# Patient Record
Sex: MALE | Race: WHITE | Employment: OTHER | URBAN - METROPOLITAN AREA
[De-identification: names, ages, dates, MRNs, and addresses within clinical notes are randomized per-mention and may not be internally consistent; named-entity substitution may affect disease eponyms.]

---

## 2021-01-26 ENCOUNTER — IMMUNIZATION (OUTPATIENT)
Dept: PRIMARY CARE CLINIC | Age: 76
End: 2021-01-26
Payer: MEDICARE

## 2021-01-26 PROCEDURE — 0001A COVID-19, PFIZER VACCINE 30MCG/0.3ML DOSE: CPT | Performed by: NURSE PRACTITIONER

## 2021-01-26 PROCEDURE — 91300 COVID-19, PFIZER VACCINE 30MCG/0.3ML DOSE: CPT | Performed by: NURSE PRACTITIONER

## 2021-02-17 ENCOUNTER — IMMUNIZATION (OUTPATIENT)
Dept: PRIMARY CARE CLINIC | Age: 76
End: 2021-02-17
Payer: MEDICARE

## 2021-02-17 PROCEDURE — 91300 COVID-19, PFIZER VACCINE 30MCG/0.3ML DOSE: CPT | Performed by: NURSE PRACTITIONER

## 2021-02-17 PROCEDURE — 0002A COVID-19, PFIZER VACCINE 30MCG/0.3ML DOSE: CPT | Performed by: NURSE PRACTITIONER

## 2022-07-11 PROBLEM — I73.9 PVD (PERIPHERAL VASCULAR DISEASE) (HCC): Status: ACTIVE | Noted: 2022-07-11

## 2023-11-03 PROBLEM — Z86.69 HISTORY OF HEARING LOSS: Status: ACTIVE | Noted: 2023-11-03

## 2023-11-03 PROBLEM — N18.30 CHRONIC KIDNEY DISEASE, STAGE 3 UNSPECIFIED (MULTI): Status: ACTIVE | Noted: 2022-07-12

## 2023-11-03 PROBLEM — H61.23 BILATERAL IMPACTED CERUMEN: Status: ACTIVE | Noted: 2023-11-03

## 2023-11-03 PROBLEM — R09.82 POST-NASAL DRAINAGE: Status: ACTIVE | Noted: 2023-11-03

## 2023-11-03 PROBLEM — H90.3 SENSORINEURAL HEARING LOSS, BILATERAL: Status: ACTIVE | Noted: 2023-11-03

## 2023-11-03 PROBLEM — E78.00 HYPERCHOLESTEROLEMIA: Status: ACTIVE | Noted: 2023-11-03

## 2023-11-03 PROBLEM — I73.9 PVD (PERIPHERAL VASCULAR DISEASE) (CMS-HCC): Status: ACTIVE | Noted: 2022-07-11

## 2023-11-03 PROBLEM — J34.89 NASAL DISCHARGE: Status: ACTIVE | Noted: 2023-11-03

## 2023-11-03 PROBLEM — G63 POLYNEUROPATHY IN DISEASES CLASSIFIED ELSEWHERE (MULTI): Status: ACTIVE | Noted: 2022-07-12

## 2023-11-03 RX ORDER — ATENOLOL 50 MG/1
50 TABLET ORAL 2 TIMES DAILY
COMMUNITY
Start: 2023-04-26

## 2023-11-03 RX ORDER — MAGNESIUM HYDROXIDE 400 MG/5ML
SUSPENSION, ORAL (FINAL DOSE FORM) ORAL
COMMUNITY
Start: 2017-08-17

## 2023-11-03 RX ORDER — RAMIPRIL 5 MG/1
CAPSULE ORAL
COMMUNITY
Start: 2020-09-11

## 2023-11-03 RX ORDER — ALBUTEROL SULFATE 90 UG/1
AEROSOL, METERED RESPIRATORY (INHALATION)
COMMUNITY
Start: 2020-09-11

## 2023-11-03 RX ORDER — GEMFIBROZIL 600 MG/1
600 TABLET, FILM COATED ORAL 2 TIMES DAILY
COMMUNITY
Start: 2015-07-30

## 2023-11-03 RX ORDER — CHOLECALCIFEROL (VITAMIN D3) 25 MCG
1000 TABLET ORAL
COMMUNITY

## 2023-11-03 RX ORDER — DIGOXIN 125 MCG
125 TABLET ORAL
COMMUNITY
Start: 2020-09-11

## 2023-11-03 RX ORDER — PRAVASTATIN SODIUM 40 MG/1
TABLET ORAL
COMMUNITY
Start: 2015-07-30

## 2023-11-03 RX ORDER — ALLOPURINOL 300 MG/1
300 TABLET ORAL
COMMUNITY
Start: 2023-04-26

## 2023-11-03 RX ORDER — PROBENECID 500 MG/1
500 TABLET, FILM COATED ORAL
COMMUNITY
Start: 2016-03-31

## 2023-11-03 NOTE — PROGRESS NOTES
HEARING AID EVALUATION    RIGHT: ReSound SN: [id number]  : needs updated  Wax Guard/Dome: needs updated  LEFT: ReSound SN: [id number]  : needs updated  Wax Guard/Dome: needs updated    Repair Warranty: out of warranty  L&D Warranty: out of warranty    Antonio Lamb was seen for a hearing aid check following ENT and audiometric evaluation appts.  Listening check revealed fair working function of devices. Please recall, patient had obtained his hearing instruments prior to 2012.    Patient noted he does not consistently utilize his devices as he is having significant difficulty with them.  He noted that he does not perform well in the presence of background noise.  Instead of conducting hearing aid check, decided to have hearing aid consultation.  Discussed different types of devices, styles, levels of technology, and pricing.  Patient does have hearing aid benefits through a third party per his insurance handout, though understands if he comes here he would be unable to utilize his benefits.  Should patient move forward with devices, he would move forward with two rechargeable Phonak Lumity RITE devices in the color champagne.  Patient will call with next steps. Patient satisfied.    N/c    Josep Pepe, CCC-A    Appt time: 9:30 - 10:15 AM

## 2023-11-09 ENCOUNTER — OFFICE VISIT (OUTPATIENT)
Dept: OTOLARYNGOLOGY | Facility: CLINIC | Age: 78
End: 2023-11-09
Payer: MEDICARE

## 2023-11-09 ENCOUNTER — CLINICAL SUPPORT (OUTPATIENT)
Dept: AUDIOLOGY | Facility: CLINIC | Age: 78
End: 2023-11-09
Payer: MEDICARE

## 2023-11-09 VITALS
DIASTOLIC BLOOD PRESSURE: 63 MMHG | WEIGHT: 138 LBS | BODY MASS INDEX: 23.56 KG/M2 | SYSTOLIC BLOOD PRESSURE: 109 MMHG | HEART RATE: 56 BPM | HEIGHT: 64 IN

## 2023-11-09 DIAGNOSIS — H90.3 SENSORINEURAL HEARING LOSS, BILATERAL: ICD-10-CM

## 2023-11-09 DIAGNOSIS — J34.89 NASAL DISCHARGE: ICD-10-CM

## 2023-11-09 DIAGNOSIS — H61.23 BILATERAL IMPACTED CERUMEN: Primary | ICD-10-CM

## 2023-11-09 DIAGNOSIS — Z86.39 HISTORY OF DIABETES MELLITUS: ICD-10-CM

## 2023-11-09 DIAGNOSIS — H90.3 SENSORINEURAL HEARING LOSS, BILATERAL: Primary | ICD-10-CM

## 2023-11-09 PROCEDURE — 1159F MED LIST DOCD IN RCRD: CPT | Performed by: OTOLARYNGOLOGY

## 2023-11-09 PROCEDURE — 3078F DIAST BP <80 MM HG: CPT | Performed by: OTOLARYNGOLOGY

## 2023-11-09 PROCEDURE — 99214 OFFICE O/P EST MOD 30 MIN: CPT | Performed by: OTOLARYNGOLOGY

## 2023-11-09 PROCEDURE — 92557 COMPREHENSIVE HEARING TEST: CPT | Performed by: AUDIOLOGIST

## 2023-11-09 PROCEDURE — 1036F TOBACCO NON-USER: CPT | Performed by: OTOLARYNGOLOGY

## 2023-11-09 PROCEDURE — 3074F SYST BP LT 130 MM HG: CPT | Performed by: OTOLARYNGOLOGY

## 2023-11-09 PROCEDURE — 69210 REMOVE IMPACTED EAR WAX UNI: CPT | Performed by: OTOLARYNGOLOGY

## 2023-11-09 NOTE — PROGRESS NOTES
Subjective   Patient ID: Antonio Lamb is a 78 y.o. male who presents for Follow-up, Cerumen Impaction, and Hearing Loss.  Hearing Loss      This 78-year-old male is being seen in the office today for recheck of his ears and hearing.  He is followed by our audiology staff as well over the past years since he wears hearing aids.  He had no difficulties with sudden hearing change or difficulties with ear pain and or drainage.  There is been no difficulties also with respiratory infections.  Very infrequently while he notes some fullness in his lower neck.      Objective   Physical Exam  EXAMINATION:    GENERAL VALENTINO.EARANCE: Alert, in no acute distress, normal pitch and clarity of voice, well-developed and nourished, cooperative.    HEAD/FACE: Normocephalic, atraumatic, normal facial movements and strength, no no tenderness to palpation, no lesions noted.    SKIN: Normal turgor, no raised or ulcerative lesions, warm and dry to palpation.    EYES: Extraocular motions intact, no nystagmus noted, pupils equal and reactive to light and accommodation, no conjunctivitis.    EARS: Both ears--external ear anatomy is normal without lesions, auditory canals are patent and without skin abrasions or lesions, cerumen partially obstructs both ears and is removed, hearing is intact to voice, tympanic membranes are intact with no acute inflammation, light reflexes present, no effusions are noted and no mastoid tenderness found to palpation.    NOSE: No external skin lesions are noted, nares are patent, septum is intact, sinuses are nontender to palpation bilaterally, no intranasal lesions or inflammation is noted, nasal valve is normal.    OROPHARYNX/ORAL CAVITY: Oropharynx is not inflamed and is without lesions, mucosa of the oral cavity is intact and without lesions, tongue is midline and mobile, no acute dental disease is noted, TMJs are mobile    LUNG-- NO wheezing or rhonchi normal respiratory effort    HEART-- No venous  congestion,  rate and rhythm regular,    NECK: No lymphadenopathy is palpated, carotid pulses are intact, neck is supple with full range of motion, no thyroid abnormalities are noted, trachea is midline, no neck masses are palpated.    LYMPHATICS: No cervical adenopathy or supraclavicular adenopathy is palpated.    NEUROLOGIC/PSYCH; alert and oriented, cranial nerves are grossly intact, gait is without falling, no motor deficits are noted.    Patient ID: Antonio Lamb is a 78 y.o. male.    Ear cerumen removal    Date/Time: 11/9/2023 9:18 AM    Performed by: Noel Garcia DMD, MD  Authorized by: Noel Garcia DMD, MD    Consent:     Consent obtained:  Verbal    Consent given by:  Patient    Risks, benefits, and alternatives were discussed: yes      Risks discussed:  Incomplete removal, pain, infection, dizziness and bleeding    Alternatives discussed:  Observation  Comments:        Procedure Ear Cleaning    Consent:  The planned procedure including the risks as well as alternatives of treatments were discussed and verbal consent obtained.    Procedure: Using otoscopic techniques, cerumen is removed with a wax loop from both ear canals.    Findings:  The external auditory canals are without inflammation or lesions and both tympanic members are normal in appearance with no evidence for middle ear effusion or signs of infection. No mastoid tenderness is noted. The patient tolerated the procedure well.      Audiogram done today revealed evidence for a mild low-frequency hearing loss in both ears in the mid frequencies down to 3000 Hz and a moderate loss is noted moderately severe in the remaining upper frequencies.  Discrimination scores 84% on the right 80% on the left both at 75 dB.  Assessment/Plan   Problem List Items Addressed This Visit             ICD-10-CM    Bilateral impacted cerumen - Primary H61.23    Nasal discharge J34.89    Sensorineural hearing loss, bilateral H90.3     Other Visit Diagnoses          Codes    History of diabetes mellitus     Z86.39          I discussed the clinical finds the patient.  There was some subtle changes in hearing on the right-hand side and both ears have had a decrease in discrimination ability.  There is a symmetric hearing loss at this point.  He has no complaints regarding tinnitus and or vertigo.  He is going to see our audiologist for check on upgrading his hearing aids which he feels would help him.  He is having no difficulties with pain or drainage although he should be seen if there is any sudden hearing change through the year or difficulties with discomfort.  Unrelated to his hearing he notes at times he will have some fullness sensations in his low neck area suggesting a globus sensation.  This is very infrequent but we did talk about swallowing and I made him aware the fact that if he starts to note difficulties more persistently he should contact the office and that would need to be assessed for him.  A yearly recheck is otherwise recommended and he should always contact the office if he has any sudden hearing change or difficulties with vertigo.

## 2023-11-09 NOTE — PROGRESS NOTES
[facility]  AUDIOMETRIC EVALUATION      Name:  Antonio Lamb   :  1945  Age:  78 y.o.  Date of Evaluation:  23    HISTORY    Antonio Lamb is seen today at the request of Noel Garcia DMD, MD, FACS. Patient stated that he has hearing aids but dies not wear them often. He further stated that he feels he may have some marginal decrease in his hearing since his last evaluation on 22.    EVALUATION  See Audiogram    RESULTS    Otoscopic Evaluation:  Right Ear:  minimal amount of cerumen  Left Ear:  minimal amount of cerumen    Pure Tone Audiometry:    Right Ear:  essentially normal to severe sensorineuralhearing loss  Left Ear:  essentially normal to severe sensorineuralhearing loss       Speech Audiometry:     Right Ear:  good in quiet when words were presented at 75 dBHL  Left Ear:  good in quiet when words were presented at 75 dBHL  Speech reception thresholds were in good agreement with pure tone testing.    DISCUSSION  Results were relayed to Noel Garcia DMD, MD, FACS    APPOINTMENT TIME  8:30 - 9:00 AM      Clari Saenz MA, CCC/A  Licensed Audiologist

## 2023-11-13 ENCOUNTER — TELEPHONE (OUTPATIENT)
Dept: AUDIOLOGY | Facility: CLINIC | Age: 78
End: 2023-11-13
Payer: MEDICARE

## 2023-11-13 NOTE — TELEPHONE ENCOUNTER
Antonio called regarding getting new hearing aids.  He reported that he was likely going to go with a different  to get his hearing aids though asked if he could follow up through us.  Discussed with patient that even if another company has the same devices they can sometimes be locked and we would not know if we could reprogram until we had the devices in front of us.  Additionally discussed $75 office visit charge if he does follow up through us.  Patient understood and satisfied.    Josep Pepe, CCC-A

## 2024-11-05 NOTE — PROGRESS NOTES
HEARING AID CHECK    RIGHT: Phonak Slim L90-R SN: [id number]  : 3 x M  Wax Guard/Dome: Cerustop,  Medium closed  LEFT: Phonak Slim L90-R SN: [id number]  : 3 x M  Wax Guard/Dome:  Medium closed    Repair Warranty: purchased online  L&D Warranty: purchased online  HA Office Visits: out of warranty - purchased online    Old Hearing Aids  RIGHT: ReSound SN: [id number]  : needs updated  Wax Guard/Dome: needs updated  LEFT: ReSound SN: [id number]  : needs updated  Wax Guard/Dome: needs updated     Repair Warranty: out of warranty  L&D Warranty: out of warranty       Antonio Lamb was seen for a hearing aid check following ENT and audiometric evaluation appts. Of note, patient had purchased Phonak hearing aids online.  Patient arrived to today's appointment with large open domes.  Counseled patient that the domes were inappropriate for his hearing sensitivity, and instead changed to medium closed domes. Listening check revealed good working function of devices.  Hearing aids were cleaned and domes and wax guards changed.  Patient satisfied.    $35 HAC with programming (, H90.3)    RECOMMENDATIONS:  -Recommend patient return in ~1 year or sooner should concerns arise.    Josep Pepe, CCC-A    Appt time: 11:20 AM - 11:40 AM

## 2024-11-08 NOTE — PROGRESS NOTES
Subjective   Patient ID: Antonio Lamb is a 79 y.o. male who presents for Follow-up.  HPI  This 79year-old male is being seen in the office today for recheck of his ears and hearing. He is followed by our audiology staff as well over the past years since he wears hearing aids. He had no difficulties with sudden hearing change or difficulties with ear pain and or drainage. There is been no difficulties also with respiratory infections. Very infrequently   he notes some fullness in his lower neck.  He did have an assessment by CT scanning this past year which showed evidence for hiatal hernia but no other significant health problems were noted on the scan.  He at times feels some fullness or resistance to swallowing very intermittently.  There is been no coughing or difficulties with aspiration.  No infections have been noted in the head and neck.  Review of Systems   A 12 point ROS  has been reviewed and are negative for complaint except for what is stated in the history of present illness and /or for past medical history as noted in the EMR.    Past Medical History:   Diagnosis Date    Impacted cerumen, bilateral 08/23/2018    Bilateral impacted cerumen    Personal history of other diseases of the musculoskeletal system and connective tissue     History of gout    Personal history of other diseases of the nervous system and sense organs     History of hearing loss    Pure hypercholesterolemia, unspecified     High cholesterol          Current Outpatient Medications:     albuterol 90 mcg/actuation inhaler, Inhale., Disp: , Rfl:     allopurinol (Zyloprim) 300 mg tablet, Take 1 tablet (300 mg) by mouth once daily., Disp: , Rfl:     atenolol (Tenormin) 50 mg tablet, Take 1 tablet (50 mg) by mouth twice a day., Disp: , Rfl:     cholecalciferol (Vitamin D-3) 25 MCG (1000 UT) tablet, Take 1 tablet (1,000 Units) by mouth once daily., Disp: , Rfl:     cyanocobalamin, vitamin B-12, 5,000 mcg tablet,disintegrating, Take by  "mouth., Disp: , Rfl:     digoxin (Lanoxin) 125 MCG tablet, Take 1 tablet (125 mcg) by mouth once daily., Disp: , Rfl:     gemfibrozil (Lopid) 600 mg tablet, Take 1 tablet (600 mg) by mouth twice a day., Disp: , Rfl:     pravastatin (Pravachol) 40 mg tablet, Take by mouth., Disp: , Rfl:     probenecid (Benemid) 500 mg tablet, Take 1 tablet (500 mg) by mouth once daily., Disp: , Rfl:     ramipril (Altace) 5 mg capsule, Take by mouth. (Patient not taking: Reported on 11/12/2024), Disp: , Rfl:      No Known Allergies    Height 1.651 m (5' 5\"), weight 64 kg (141 lb).    Objective   Physical Exam  EXAMINATION:     GENERAL VALENTINO.EARANCE: Alert, in no acute distress, normal pitch and clarity of voice, well-developed and nourished, cooperative.     HEAD/FACE: Normocephalic, atraumatic, normal facial movements and strength, no no tenderness to palpation, no lesions noted.     SKIN: Normal turgor, no raised or ulcerative lesions, warm and dry to palpation.     EYES: Extraocular motions intact, no nystagmus noted, pupils equal and reactive to light and accommodation, no conjunctivitis.     EARS: Both ears--external ear anatomy is normal without lesions, auditory canals are patent and without skin abrasions or lesions, cerumen partially obstructs both ears and is removed, hearing is intact to voice, tympanic membranes are intact with no acute inflammation, light reflexes present, no effusions are noted and no mastoid tenderness found to palpation.     NOSE: No external skin lesions are noted, nares are patent, septum is intact, sinuses are nontender to palpation bilaterally, no intranasal lesions or inflammation is noted, nasal valve is normal.     OROPHARYNX/ORAL CAVITY: Oropharynx is not inflamed and is without lesions, mucosa of the oral cavity is intact and without lesions, tongue is midline and mobile, no acute dental disease is noted, TMJs are mobile     LUNG-- NO wheezing or rhonchi normal respiratory effort     HEART-- No " venous congestion,  rate and rhythm regular,     NECK: No lymphadenopathy is palpated, carotid pulses are intact, neck is supple with full range of motion, no thyroid abnormalities are noted, trachea is midline, no neck masses are palpated.     LYMPHATICS: No cervical adenopathy or supraclavicular adenopathy is palpated.     NEUROLOGIC/PSYCH; alert and oriented, cranial nerves are grossly intact, gait is without falling, no motor deficits are noted.    Patient ID: Antonio Lamb is a 79 y.o. male.    Ear cerumen removal    Date/Time: 11/12/2024 11:10 AM    Performed by: Noel Garcia DMD, MD  Authorized by: Noel Garcia DMD, MD    Consent:     Consent obtained:  Verbal    Consent given by:  Patient    Risks discussed:  Pain and incomplete removal    Alternatives discussed:  No treatment and alternative treatment  Universal protocol:     Procedure explained and questions answered to patient or proxy's satisfaction: yes      Imaging studies available: no      Required blood products, implants, devices, and special equipment available: no      Patient identity confirmed:  Verbally with patient  Procedure details:     Location:  L ear and R ear    Procedure type: curette      Procedure type comment:  Or suction    Procedure outcomes: cerumen removed    Post-procedure details:     Inspection:  No bleeding and ear canal clear    Hearing quality:  Improved    Procedure completion:  Tolerated well, no immediate complications    His audiogram today continues to show a mild to moderately severe sloping sensorineural hearing loss in both ears.  80% discrimination ability is noted on the right at 80 dB 84% on the left at 80 dB.  There is slight  Assessment/Plan   Problem List Items Addressed This Visit             ICD-10-CM    Bilateral impacted cerumen H61.23    Sensorineural hearing loss, bilateral - Primary H90.3     Other Visit Diagnoses         Codes    History of diabetes mellitus     Z86.39          I  discussed the present hearing test findings with the patient. Since the last test there has been mild changes in the hearing of individual frequencies sound. Discrimination ability remains basically unchanged. It would be advised that a yearly audiogram be done unless symptoms develop in regards to progressive loss, new onset vertigo, or changes regarding tinnitus. Avoidance of loud noise without ear protection is advised. Rehabilitation using hearing aids is advised.    We did discuss swallowing difficulties which can be associated with reflux issues.  At the present time he is having no major issues with this function although he is encouraged to contact the office if he starts to have difficulties with coughing throat clearing or concerns about aspiration.    This patient is advised to follow up with their PCP for all other health care issues and treatment. Dictation was done with dragon transcription and errors in spelling  and diction are possible.       Noel Garcia DMD, MD 11/12/24 11:10 AM

## 2024-11-12 ENCOUNTER — APPOINTMENT (OUTPATIENT)
Dept: OTOLARYNGOLOGY | Facility: CLINIC | Age: 79
End: 2024-11-12
Payer: MEDICARE

## 2024-11-12 ENCOUNTER — APPOINTMENT (OUTPATIENT)
Dept: AUDIOLOGY | Facility: CLINIC | Age: 79
End: 2024-11-12
Payer: MEDICARE

## 2024-11-12 VITALS — BODY MASS INDEX: 23.49 KG/M2 | WEIGHT: 141 LBS | HEIGHT: 65 IN

## 2024-11-12 DIAGNOSIS — H90.3 SENSORINEURAL HEARING LOSS, BILATERAL: Primary | ICD-10-CM

## 2024-11-12 DIAGNOSIS — Z86.39 HISTORY OF DIABETES MELLITUS: ICD-10-CM

## 2024-11-12 DIAGNOSIS — H61.23 BILATERAL IMPACTED CERUMEN: ICD-10-CM

## 2024-11-12 PROBLEM — R31.0 GROSS HEMATURIA: Status: ACTIVE | Noted: 2024-06-26

## 2024-11-12 PROBLEM — N20.0 RENAL CALCULUS, LEFT: Status: ACTIVE | Noted: 2024-08-05

## 2024-11-12 PROBLEM — N40.0 ENLARGED PROSTATE: Status: ACTIVE | Noted: 2024-08-05

## 2024-11-12 PROCEDURE — 69210 REMOVE IMPACTED EAR WAX UNI: CPT | Performed by: OTOLARYNGOLOGY

## 2024-11-12 PROCEDURE — 1159F MED LIST DOCD IN RCRD: CPT | Performed by: OTOLARYNGOLOGY

## 2024-11-12 PROCEDURE — V5267 HEARING AID SUP/ACCESS/DEV: HCPCS | Performed by: AUDIOLOGIST

## 2024-11-12 PROCEDURE — 92557 COMPREHENSIVE HEARING TEST: CPT | Performed by: AUDIOLOGIST

## 2024-11-12 PROCEDURE — 1160F RVW MEDS BY RX/DR IN RCRD: CPT | Performed by: OTOLARYNGOLOGY

## 2024-11-12 PROCEDURE — 99214 OFFICE O/P EST MOD 30 MIN: CPT | Performed by: OTOLARYNGOLOGY

## 2024-11-12 PROCEDURE — 92567 TYMPANOMETRY: CPT | Performed by: AUDIOLOGIST

## 2024-11-12 NOTE — PROGRESS NOTES
[facility]  AUDIOMETRIC EVALUATION      Name:  Antonio Lamb   :  1945  Age:  79 y.o.  Date of Evaluation:  24    HISTORY    Antonio Lamb is seen today at the request of Noel Garcia M.D., DELORES., F.A.C.S.  The patient is an established patient monitoring hearing loss progression.    EVALUATION  See scanned audiogram in Media and included at the end of this report.    RESULTS    Otoscopic Evaluation:  Right Ear:  clear   Left Ear:  clear    Tympanometry:   Right Ear:  Type A, consistent with normal eardrum mobility and middle ear pressure   Left Ear:  Type A, consistent with normal eardrum mobility and middle ear pressure    Acoustic reflexes were not completed    Pure Tone Audiometry:    Right Ear:  mild to profound sensorineural hearing loss  Left Ear:  normal to severe sensorineural hearing loss       Speech Audiometry:    Right Ear:  good in quiet at an elevated presentation level  Left Ear:  good in quiet at an elevated presentation level  Speech reception thresholds were in good agreement with pure tone testing.    DISCUSSION  Results were relayed to Noel Garcia M.D., DELORES., F.A.C.S.    APPOINTMENT TIME  10:00am-10:30am     kT Collins  Doctor of Audiology  Senior Audiologist

## 2025-05-05 ENCOUNTER — CLINICAL SUPPORT (OUTPATIENT)
Dept: AUDIOLOGY | Facility: CLINIC | Age: 80
End: 2025-05-05
Payer: MEDICARE

## 2025-05-05 DIAGNOSIS — H90.3 SENSORINEURAL HEARING LOSS, BILATERAL: Primary | ICD-10-CM

## 2025-05-05 NOTE — PROGRESS NOTES
HEARING AID DROP OFF    RIGHT: Phonak Slim L90-R SN: [id number]  : 3 x M  Wax Guard/Dome: Cerustop,  Medium closed  LEFT: Phonak Slim L90-R SN: [id number]  : 3 x M  Wax Guard/Dome:  Medium closed     Repair Warranty: purchased online  L&D Warranty: purchased online  HA Office Visits: out of warranty - purchased online    Antonio Lamb dropped off his left hearing aid due to non working function of device. Listening check confirmed. Hearing aid was cleaned and dome and wax guard changed without improvement. Hearing aid placed on  and did not charge.  Attempted to reset device without improvement.  Patient was called and discussed we would leave the device on the clinic  overnight and would discuss any next steps in the  morning.  Patient in agreement with this plan.    Josep Pepe, CCC-A

## 2025-05-06 ENCOUNTER — TELEPHONE (OUTPATIENT)
Dept: AUDIOLOGY | Facility: CLINIC | Age: 80
End: 2025-05-06
Payer: MEDICARE

## 2025-05-06 NOTE — TELEPHONE ENCOUNTER
Antonio was called as his left hearing aid was attempted to be reset without improvement. Patient in agreement to send device away and will pay $15 for hearing aid drop off fee.  Patient additionally ask I make note to replace the .  Patient inquired into a loaner, though discussed that it would likely need to be a battery device as I was unsure if we had the slim as a demo. Patient declined. Patient satisfied.    Josep Pepe, CCC-A

## 2025-05-19 ENCOUNTER — DOCUMENTATION (OUTPATIENT)
Dept: AUDIOLOGY | Facility: CLINIC | Age: 80
End: 2025-05-19
Payer: MEDICARE

## 2025-05-19 NOTE — PROGRESS NOTES
HEARING AID CHECK IN    RIGHT: Phonak Slim L90-R SN: [id number]  : 3 x M  Wax Guard/Dome: Cerustop,  Medium closed  LEFT: Phonak Slim L90-R SN: [id number]  : 3 x M  Wax Guard/Dome:  Medium closed     Repair Warranty: purchased online  L&D Warranty: purchased online  HA Office Visits: out of warranty - purchased online    Antonio Lamb's device was received from .  Device charged.  Device settings could not be reprogrammed by  and patient will need to return for hearing aid check for binaural pairing to be re-established.   notified to contact patient.    Josep Pepe, CCC-A

## 2025-05-20 ENCOUNTER — CLINICAL SUPPORT (OUTPATIENT)
Dept: AUDIOLOGY | Facility: CLINIC | Age: 80
End: 2025-05-20
Payer: MEDICARE

## 2025-05-20 DIAGNOSIS — H90.3 SENSORINEURAL HEARING LOSS, BILATERAL: Primary | ICD-10-CM

## 2025-05-20 NOTE — PROGRESS NOTES
HEARING AID CHECK    RIGHT: Phonak Slim L90-R SN: [id number]  : 3 x M  Wax Guard/Dome: Cerustop,  Medium closed  LEFT: Phonak Slim L90-R SN: [id number]  : 3 x M  Wax Guard/Dome:  Medium closed     Repair Warranty: purchased online  L&D Warranty: purchased online  HA Office Visits: out of warranty - purchased online    Antonio Lamb was seen for a hearing aid check. Repaired hearing aid paired back to patient's other device and both paired to patient's phone.  Patient satisfied.    $15 HA Drop Off (, H90.3)    RECOMMENDATIONS:  -Recommend patient return in ~6 months or sooner should concerns arise.    Josep Pepe, CCC-A    Appt time: 3:20 - 3:40 PM

## 2025-06-18 ENCOUNTER — APPOINTMENT (OUTPATIENT)
Dept: AUDIOLOGY | Facility: CLINIC | Age: 80
End: 2025-06-18
Payer: MEDICARE

## 2025-11-13 ENCOUNTER — APPOINTMENT (OUTPATIENT)
Dept: AUDIOLOGY | Facility: CLINIC | Age: 80
End: 2025-11-13
Payer: MEDICARE

## 2025-11-13 ENCOUNTER — APPOINTMENT (OUTPATIENT)
Dept: OTOLARYNGOLOGY | Facility: CLINIC | Age: 80
End: 2025-11-13
Payer: MEDICARE

## 2025-11-18 ENCOUNTER — APPOINTMENT (OUTPATIENT)
Dept: AUDIOLOGY | Facility: CLINIC | Age: 80
End: 2025-11-18
Payer: MEDICARE

## 2025-11-18 ENCOUNTER — APPOINTMENT (OUTPATIENT)
Dept: OTOLARYNGOLOGY | Facility: CLINIC | Age: 80
End: 2025-11-18
Payer: MEDICARE

## 2025-12-02 ENCOUNTER — APPOINTMENT (OUTPATIENT)
Dept: AUDIOLOGY | Facility: CLINIC | Age: 80
End: 2025-12-02
Payer: MEDICARE

## 2025-12-02 ENCOUNTER — APPOINTMENT (OUTPATIENT)
Dept: OTOLARYNGOLOGY | Facility: CLINIC | Age: 80
End: 2025-12-02
Payer: MEDICARE